# Patient Record
Sex: FEMALE | Race: OTHER | Employment: FULL TIME | URBAN - METROPOLITAN AREA
[De-identification: names, ages, dates, MRNs, and addresses within clinical notes are randomized per-mention and may not be internally consistent; named-entity substitution may affect disease eponyms.]

---

## 2017-07-09 ENCOUNTER — HOSPITAL ENCOUNTER (EMERGENCY)
Age: 37
Discharge: HOME | End: 2017-07-09
Attending: EMERGENCY MEDICINE
Payer: COMMERCIAL

## 2017-07-09 ENCOUNTER — APPOINTMENT (OUTPATIENT)
Dept: GENERAL RADIOLOGY | Age: 37
End: 2017-07-09
Payer: COMMERCIAL

## 2017-07-09 ENCOUNTER — APPOINTMENT (OUTPATIENT)
Dept: CT IMAGING | Age: 37
End: 2017-07-09
Payer: COMMERCIAL

## 2017-07-09 VITALS
RESPIRATION RATE: 16 BRPM | HEART RATE: 83 BPM | DIASTOLIC BLOOD PRESSURE: 92 MMHG | OXYGEN SATURATION: 100 % | SYSTOLIC BLOOD PRESSURE: 135 MMHG | BODY MASS INDEX: 23.3 KG/M2 | TEMPERATURE: 98.2 F | WEIGHT: 140 LBS

## 2017-07-09 DIAGNOSIS — T07.XXXA MULTIPLE ABRASIONS: Primary | ICD-10-CM

## 2017-07-09 PROCEDURE — 70450 CT HEAD/BRAIN W/O DYE: CPT

## 2017-07-09 PROCEDURE — 71010 XR CHEST PORTABLE: CPT

## 2017-07-09 PROCEDURE — 72170 X-RAY EXAM OF PELVIS: CPT

## 2017-07-09 PROCEDURE — 99285 EMERGENCY DEPT VISIT HI MDM: CPT

## 2017-07-09 PROCEDURE — 96376 TX/PRO/DX INJ SAME DRUG ADON: CPT

## 2017-07-09 PROCEDURE — 12001 RPR S/N/AX/GEN/TRNK 2.5CM/<: CPT

## 2017-07-09 PROCEDURE — 73610 X-RAY EXAM OF ANKLE: CPT

## 2017-07-09 PROCEDURE — 6360000002 HC RX W HCPCS: Performed by: EMERGENCY MEDICINE

## 2017-07-09 PROCEDURE — 73562 X-RAY EXAM OF KNEE 3: CPT

## 2017-07-09 PROCEDURE — 96374 THER/PROPH/DIAG INJ IV PUSH: CPT

## 2017-07-09 PROCEDURE — 6360000002 HC RX W HCPCS

## 2017-07-09 PROCEDURE — 73080 X-RAY EXAM OF ELBOW: CPT

## 2017-07-09 RX ORDER — OXYCODONE HYDROCHLORIDE AND ACETAMINOPHEN 5; 325 MG/1; MG/1
1 TABLET ORAL EVERY 8 HOURS PRN
Qty: 10 TABLET | Refills: 0 | Status: SHIPPED | OUTPATIENT
Start: 2017-07-09 | End: 2017-07-12 | Stop reason: SDUPTHER

## 2017-07-09 RX ADMIN — HYDROMORPHONE HYDROCHLORIDE 1 MG: 1 INJECTION, SOLUTION INTRAMUSCULAR; INTRAVENOUS; SUBCUTANEOUS at 12:38

## 2017-07-09 RX ADMIN — HYDROMORPHONE HYDROCHLORIDE 1 MG: 1 INJECTION, SOLUTION INTRAMUSCULAR; INTRAVENOUS; SUBCUTANEOUS at 14:20

## 2017-07-09 RX ADMIN — Medication 1 MG: at 12:38

## 2017-07-09 ASSESSMENT — PAIN DESCRIPTION - LOCATION: LOCATION: GENERALIZED

## 2017-07-09 ASSESSMENT — ENCOUNTER SYMPTOMS
PHOTOPHOBIA: 0
VOMITING: 0
DIARRHEA: 0
CHEST TIGHTNESS: 0
COUGH: 0
ABDOMINAL PAIN: 0
WHEEZING: 0
SHORTNESS OF BREATH: 0
NAUSEA: 0
FACIAL SWELLING: 1

## 2017-07-09 ASSESSMENT — PAIN DESCRIPTION - DESCRIPTORS: DESCRIPTORS: BURNING

## 2017-07-09 ASSESSMENT — PAIN DESCRIPTION - FREQUENCY: FREQUENCY: CONTINUOUS

## 2017-07-09 ASSESSMENT — PAIN DESCRIPTION - PAIN TYPE: TYPE: ACUTE PAIN

## 2017-07-09 ASSESSMENT — PAIN SCALES - GENERAL: PAINLEVEL_OUTOF10: 10
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
## 2017-07-09 NOTE — ED AVS SNAPSHOT
You may receive a survey regarding the care you received during your stay. Your input is valuable to us. We encourage you to complete and return your survey in the envelope provided. We hope you will choose us in the future for your healthcare needs. Patient Information     Patient Name MARLENE Hutchinson 1980      Care Provided at:     Name Address Phone       [facility] [place] [address] [place] [address] [phone]            Your Visit    Here you will find information about your visit, including the reason for your visit. Please take this sheet with you when you visit your doctor or other health care provider in the future. It will help determine the best possible medical care for you at that time. If you have any questions once you leave the hospital, please call the department phone number listed below. Diagnoses this visit     Your diagnosis was MULTIPLE ABRASIONS. Visit Information     Date of Visit Department Dept Phone    2017 [facility] [phone]      You were seen by     You were seen by Angelica Thomas MD, Gloria Benedict MD, and Nicci Espinoza MD.       Follow-up Appointments    Below is a list of your follow-up and future appointments. This may not be a complete list as you may have made appointments directly with providers that we are not aware of or your providers may have made some for you. Please call your providers to confirm appointments. It is important to keep your appointments. Please bring your current insurance card, photo ID, co-pay, and all medication bottles to your appointment. If self-pay, payment is expected at the time of service. Follow-up Information     Follow up with Monica Phan MD In 1 day.     Specialty:  Internal Medicine    Why:  For suture removal, and FMLA paperwork     Contact information:    [facility] [address] [place] [address] [phone] Our records indicate that you have an active Contactualt account. You can view your After Visit Summary by going to [email or web address] and logging in with your Contactualt username and password. If you don't have a [employer] username and password but a parent or guardian has access to your record, the parent or guardian should login with their own Contact[employer]t username and password and access your record to view the After Visit Summary. Additional Information  If you have questions, please contact the physician practice where you receive care. Remember, [employer] is NOT to be used for urgent needs. For medical emergencies, dial 911. For questions regarding your proteonomixhart account call [phone]. If you have a clinical question, please call your doctor's office. View your information online  ? Review your current list of  medications, immunization, and allergies. ? Review your future test results online . ? Review your discharge instructions provided by your caregivers at discharge    Certain functionality such as prescription refills, scheduling appointments or sending messages to your provider are not activated if your provider does not use "[employer] in his/her office    For questions regarding your proteonomixhart account call [phone]. If you have a clinical question, please call your doctor's office. The information on all pages of the After Visit Summary has been reviewed with me, the patient and/or responsible adult, by my health care provider(s). I had the opportunity to ask questions regarding this information. I understand I should dispose of my armband safely at home to protect my health information. A complete copy of the After Visit Summary has been given to me, the patient and/or responsible adult.          Patient Signature/Responsible Adult: ___________________________________    Nurse Signature: ___________________________________ Resident/MLP Signature: ___________________________________  Attending Signature: ___________________________________    Date:____________Time:____________              Discharge Instructions       Thank you for visiting [address] [place] [address] Emergency Department. You need to call in morning to make appointment as directed with appropriate doctor. Should you have any questions regarding your care or further treatment, please call [address] [place] [address] Emergency Department at [phone]. Take any medications as prescribed, if given any, otherwise for pain Use ibuprofen or Tylenol (unless prescribed medications that have Tylenol in it). You can take over the counter Ibuprofen (advil) tablets (4 tablets every 8 hours or 3 tablets every 6 hours or 2 tablets every 4 hours)    Return to ED if symptoms worsen, do not improve, fever > 101.5, excessive nausea or vomiting, and unable to follow up with your physician, or other any other  Care or concern. Scrapes (Abrasions): Care Instructions  Your Care Instructions  Scrapes (abrasions) are wounds where your skin has been rubbed or torn off. Most scrapes do not go deep into the skin, but some may remove several layers of skin. Scrapes usually don't bleed much, but they may ooze pinkish fluid. Scrapes on the head or face may appear worse than they are. They may bleed a lot because of the good blood supply to this area. Most scrapes heal well and may not need a bandage. They usually heal within 3 to 7 days. A large, deep scrape may take 1 to 2 weeks or longer to heal. A scab may form on some scrapes. Follow-up care is a key part of your treatment and safety. Be sure to make and go to all appointments, and call your doctor if you are having problems. It's also a good idea to know your test results and keep a list of the medicines you take. How can you care for yourself at home?   · If your doctor told you how to care for your wound, follow your doctor's instructions. If you did not get instructions, follow this general advice:  ¨ Wash the scrape with clean water 2 times a day. Don't use hydrogen peroxide or alcohol, which can slow healing. ¨ You may cover the scrape with a thin layer of petroleum jelly, such as Vaseline, and a nonstick bandage. ¨ Apply more petroleum jelly and replace the bandage as needed. · Prop up the injured area on a pillow anytime you sit or lie down during the next 3 days. Try to keep it above the level of your heart. This will help reduce swelling. · Be safe with medicines. Take pain medicines exactly as directed. ¨ If the doctor gave you a prescription medicine for pain, take it as prescribed. ¨ If you are not taking a prescription pain medicine, ask your doctor if you can take an over-the-counter medicine. When should you call for help? Call your doctor now or seek immediate medical care if:  · You have signs of infection, such as:  ¨ Increased pain, swelling, warmth, or redness around the scrape. ¨ Red streaks leading from the scrape. ¨ Pus draining from the scrape. ¨ A fever. · The scrape starts to bleed, and blood soaks through the bandage. Oozing small amounts of blood is normal.  Watch closely for changes in your health, and be sure to contact your doctor if the scrape is not getting better each day. Where can you learn more? Go to [email or web address] and sign in to your [employer] account. Enter A374 in the [facility] box to learn more about \"Scrapes (Abrasions): Care Instructions. \"     If you do not have an account, please click on the \"Sign Up Now\" link. Current as of: May 27, 2016  Content Version: 11.2  © 2056-1322 [employer] Care instructions adapted under license by [facility] ([facility]).  If you have questions about a medical condition or this instruction, always ask your healthcare professional. Gerardo Rodriguez
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
## 2017-07-09 NOTE — ED NOTES
Pt resting on cot, nad, rr even and unlabored. Pt updated, no needs.  Suture placement completed by med student     Compa Benedict RN  07/09/17 [id number]
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
## 2017-07-09 NOTE — ED AVS SNAPSHOT
ED Patient Work/School Excuse Letter         [facility] ED  [address] [facility]   Dept: [phone]       July 9, 2017    Patient: Nagi Agee   YOB: 1980   Date of Visit: 7/9/2017       To Whom It May Concern:    Cece Taveras was seen and treated in our Emergency Department on 7/9/2017. She may return to work on 07/12/17     Please use this time to follow up with PCP regarding FMLA paperwork     If you have any questions or concerns, please don't hesitate to call.     Sincerely,    Resident         Signature:__________________________________
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
## 2017-07-12 ENCOUNTER — OFFICE VISIT (OUTPATIENT)
Dept: INTERNAL MEDICINE CLINIC | Age: 37
End: 2017-07-12
Payer: COMMERCIAL

## 2017-07-12 VITALS — HEIGHT: 65 IN | WEIGHT: 137 LBS | BODY MASS INDEX: 22.82 KG/M2 | RESPIRATION RATE: 14 BRPM

## 2017-07-12 DIAGNOSIS — S51.012D: Primary | ICD-10-CM

## 2017-07-12 DIAGNOSIS — T07.XXXA ABRASION, MULTIPLE SITES: ICD-10-CM
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
## 2017-07-12 DIAGNOSIS — M25.471 ANKLE SWELLING, RIGHT: ICD-10-CM

## 2017-07-12 PROCEDURE — 99213 OFFICE O/P EST LOW 20 MIN: CPT | Performed by: NURSE PRACTITIONER

## 2017-07-12 RX ORDER — OXYCODONE HYDROCHLORIDE AND ACETAMINOPHEN 5; 325 MG/1; MG/1
1 TABLET ORAL EVERY 8 HOURS PRN
Qty: 21 TABLET | Refills: 0 | Status: SHIPPED | OUTPATIENT
Start: 2017-07-12 | End: 2017-07-19

## 2017-07-12 RX ORDER — IBUPROFEN 800 MG/1
800 TABLET ORAL EVERY 8 HOURS PRN
Qty: 90 TABLET | Refills: 0 | Status: SHIPPED | OUTPATIENT
Start: 2017-07-12

## 2017-07-12 ASSESSMENT — ENCOUNTER SYMPTOMS
SINUS PRESSURE: 0
BLOOD IN STOOL: 0
COUGH: 0
WHEEZING: 0
SHORTNESS OF BREATH: 0
ABDOMINAL PAIN: 0
RHINORRHEA: 0
EYE DISCHARGE: 0

## 2017-07-12 ASSESSMENT — PATIENT HEALTH QUESTIONNAIRE - PHQ9
2. FEELING DOWN, DEPRESSED OR HOPELESS: 0
SUM OF ALL RESPONSES TO PHQ QUESTIONS 1-9: 0
1. LITTLE INTEREST OR PLEASURE IN DOING THINGS: 0
SUM OF ALL RESPONSES TO PHQ9 QUESTIONS 1 & 2: 0

## 2017-07-17 ENCOUNTER — OFFICE VISIT (OUTPATIENT)
Dept: INTERNAL MEDICINE CLINIC | Age: 37
End: 2017-07-17
Payer: COMMERCIAL

## 2017-07-17 VITALS — BODY MASS INDEX: 23.16 KG/M2 | HEIGHT: 65 IN | HEART RATE: 80 BPM | WEIGHT: 139 LBS

## 2017-07-17 DIAGNOSIS — L24.A9 WOUND DRAINAGE: Primary | ICD-10-CM

## 2017-07-17 PROCEDURE — 99213 OFFICE O/P EST LOW 20 MIN: CPT | Performed by: NURSE PRACTITIONER

## 2017-07-17 RX ORDER — AMOXICILLIN AND CLAVULANATE POTASSIUM 875; 125 MG/1; MG/1
1 TABLET, FILM COATED ORAL 2 TIMES DAILY
Qty: 20 TABLET | Refills: 0 | Status: SHIPPED | OUTPATIENT
Start: 2017-07-17 | End: 2017-07-27

## 2017-07-27 ENCOUNTER — OFFICE VISIT (OUTPATIENT)
Dept: INTERNAL MEDICINE CLINIC | Age: 37
End: 2017-07-27
Payer: COMMERCIAL

## 2017-07-27 VITALS
HEIGHT: 65 IN | SYSTOLIC BLOOD PRESSURE: 122 MMHG | BODY MASS INDEX: 23.18 KG/M2 | WEIGHT: 139.11 LBS | DIASTOLIC BLOOD PRESSURE: 78 MMHG

## 2017-07-27 DIAGNOSIS — T14.8XXA MULTIPLE WOUNDS OF SKIN: ICD-10-CM
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
## 2017-07-27 DIAGNOSIS — S99.911D ANKLE INJURY, RIGHT, SUBSEQUENT ENCOUNTER: ICD-10-CM
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
## 2017-07-27 PROCEDURE — 99213 OFFICE O/P EST LOW 20 MIN: CPT | Performed by: NURSE PRACTITIONER

## 2017-07-27 ASSESSMENT — ENCOUNTER SYMPTOMS
SHORTNESS OF BREATH: 0
RHINORRHEA: 0
COUGH: 0
ABDOMINAL PAIN: 0
BLOOD IN STOOL: 0
EYE DISCHARGE: 0
WHEEZING: 0
SINUS PRESSURE: 0

## 2017-09-07 ENCOUNTER — OFFICE VISIT (OUTPATIENT)
Dept: INTERNAL MEDICINE CLINIC | Age: 37
End: 2017-09-07
Payer: COMMERCIAL

## 2017-09-07 VITALS
HEIGHT: 65 IN | WEIGHT: 138 LBS | SYSTOLIC BLOOD PRESSURE: 118 MMHG | DIASTOLIC BLOOD PRESSURE: 78 MMHG | BODY MASS INDEX: 22.99 KG/M2

## 2017-09-07 DIAGNOSIS — L68.0 HIRSUTISM: ICD-10-CM

## 2017-09-07 DIAGNOSIS — F41.9 ANXIETY: Primary | ICD-10-CM

## 2017-09-07 DIAGNOSIS — E28.2 PCOS (POLYCYSTIC OVARIAN SYNDROME): ICD-10-CM
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
## 2017-09-07 PROCEDURE — 99214 OFFICE O/P EST MOD 30 MIN: CPT | Performed by: INTERNAL MEDICINE

## 2017-09-07 RX ORDER — CITALOPRAM 10 MG/1
10 TABLET ORAL DAILY
Qty: 30 TABLET | Refills: 3 | Status: SHIPPED | OUTPATIENT
Start: 2017-09-07 | End: 2017-10-09 | Stop reason: DRUGHIGH

## 2017-09-07 ASSESSMENT — ENCOUNTER SYMPTOMS
COUGH: 0
TROUBLE SWALLOWING: 0
STRIDOR: 0
ABDOMINAL DISTENTION: 0
BLOOD IN STOOL: 0
SORE THROAT: 0
CHOKING: 0
EYE PAIN: 0
APNEA: 0
EYE REDNESS: 0
NAUSEA: 0
EYE ITCHING: 0
PHOTOPHOBIA: 0
DIARRHEA: 0
EYE DISCHARGE: 0
VOICE CHANGE: 0
RECTAL PAIN: 0
SINUS PRESSURE: 0
CHEST TIGHTNESS: 0
ANAL BLEEDING: 0
SHORTNESS OF BREATH: 0
RHINORRHEA: 0
ABDOMINAL PAIN: 0
COLOR CHANGE: 0
FACIAL SWELLING: 0
BACK PAIN: 0
WHEEZING: 0
CONSTIPATION: 1
VOMITING: 0

## 2017-09-18 ENCOUNTER — HOSPITAL ENCOUNTER (OUTPATIENT)
Age: 37
Discharge: HOME | End: 2017-09-18
Payer: COMMERCIAL

## 2017-09-18 DIAGNOSIS — L68.0 HIRSUTISM: ICD-10-CM

## 2017-09-18 DIAGNOSIS — F41.9 ANXIETY: ICD-10-CM

## 2017-09-18 DIAGNOSIS — E28.2 PCOS (POLYCYSTIC OVARIAN SYNDROME): ICD-10-CM

## 2017-09-18 LAB
-: ABNORMAL
ABSOLUTE EOS #: 0.1 K/UL (ref 0–0.4)
ABSOLUTE LYMPH #: 1.9 K/UL (ref 1–4.8)
ABSOLUTE MONO #: 0.5 K/UL (ref 0.2–0.8)
ALBUMIN SERPL-MCNC: 4.1 G/DL (ref 3.5–5.2)
ALBUMIN/GLOBULIN RATIO: ABNORMAL (ref 1–2.5)
ALP BLD-CCNC: 49 U/L (ref 35–104)
ALT SERPL-CCNC: 9 U/L (ref 5–33)
AMORPHOUS: ABNORMAL
ANION GAP SERPL CALCULATED.3IONS-SCNC: 10 MMOL/L (ref 9–17)
AST SERPL-CCNC: 14 U/L
BACTERIA: ABNORMAL
BASOPHILS # BLD: 1 %
BASOPHILS ABSOLUTE: 0 K/UL (ref 0–0.2)
BILIRUB SERPL-MCNC: 1.19 MG/DL (ref 0.3–1.2)
BILIRUBIN URINE: NEGATIVE
BUN BLDV-MCNC: 14 MG/DL (ref 6–20)
BUN/CREAT BLD: 18 (ref 9–20)
CALCIUM SERPL-MCNC: 8.4 MG/DL (ref 8.6–10.4)
CASTS UA: ABNORMAL /LPF
CHLORIDE BLD-SCNC: 104 MMOL/L (ref 98–107)
CHOLESTEROL/HDL RATIO: 2.6
CHOLESTEROL: 143 MG/DL
CO2: 26 MMOL/L (ref 20–31)
COLOR: ABNORMAL
COMMENT UA: ABNORMAL
CREAT SERPL-MCNC: 0.79 MG/DL (ref 0.5–0.9)
CRYSTALS, UA: ABNORMAL /HPF
DIFFERENTIAL TYPE: NORMAL
EOSINOPHILS RELATIVE PERCENT: 2 %
EPITHELIAL CELLS UA: ABNORMAL /HPF
GFR AFRICAN AMERICAN: >60 ML/MIN
GFR NON-AFRICAN AMERICAN: >60 ML/MIN
GFR SERPL CREATININE-BSD FRML MDRD: ABNORMAL ML/MIN/{1.73_M2}
GFR SERPL CREATININE-BSD FRML MDRD: ABNORMAL ML/MIN/{1.73_M2}
GLUCOSE BLD-MCNC: 91 MG/DL (ref 70–99)
GLUCOSE URINE: NEGATIVE
HCT VFR BLD CALC: 41 % (ref 36–46)
HDLC SERPL-MCNC: 54 MG/DL
HEMOGLOBIN: 14.2 G/DL (ref 12–16)
KETONES, URINE: NEGATIVE
LDL CHOLESTEROL: 79 MG/DL (ref 0–130)
LEUKOCYTE ESTERASE, URINE: ABNORMAL
LYMPHOCYTES # BLD: 31 %
MCH RBC QN AUTO: 32.4 PG (ref 26–34)
MCHC RBC AUTO-ENTMCNC: 34.7 G/DL (ref 31–37)
MCV RBC AUTO: 93.2 FL (ref 80–100)
MONOCYTES # BLD: 8 %
MUCUS: ABNORMAL
NITRITE, URINE: NEGATIVE
OTHER OBSERVATIONS UA: ABNORMAL
PDW BLD-RTO: 11.9 % (ref 11.5–14.5)
PH UA: 6.5 (ref 5–8)
PLATELET # BLD: 249 K/UL (ref 130–400)
PLATELET ESTIMATE: NORMAL
PMV BLD AUTO: NORMAL FL (ref 6–12)
POTASSIUM SERPL-SCNC: 4.1 MMOL/L (ref 3.7–5.3)
PROTEIN UA: ABNORMAL
RBC # BLD: 4.4 M/UL (ref 4–5.2)
RBC # BLD: NORMAL 10*6/UL
RBC UA: ABNORMAL /HPF (ref 0–2)
RENAL EPITHELIAL, UA: ABNORMAL /HPF
SEG NEUTROPHILS: 58 %
SEGMENTED NEUTROPHILS ABSOLUTE COUNT: 3.4 K/UL (ref 1.8–7.7)
SODIUM BLD-SCNC: 140 MMOL/L (ref 135–144)
SPECIFIC GRAVITY UA: 1.02 (ref 1–1.03)
TOTAL PROTEIN: 6.1 G/DL (ref 6.4–8.3)
TRICHOMONAS: ABNORMAL
TRIGL SERPL-MCNC: 52 MG/DL
TSH SERPL DL<=0.05 MIU/L-ACNC: 1.96 MIU/L (ref 0.3–5)
TURBIDITY: ABNORMAL
URINE HGB: ABNORMAL
UROBILINOGEN, URINE: NORMAL
VITAMIN D 25-HYDROXY: 34.7 NG/ML (ref 30–100)
VLDLC SERPL CALC-MCNC: NORMAL MG/DL (ref 1–30)
WBC # BLD: 5.9 K/UL (ref 3.5–11)
WBC # BLD: NORMAL 10*3/UL
WBC UA: ABNORMAL /HPF (ref 0–5)
YEAST: ABNORMAL

## 2017-09-18 PROCEDURE — 80053 COMPREHEN METABOLIC PANEL: CPT

## 2017-09-18 PROCEDURE — 80061 LIPID PANEL: CPT

## 2017-09-18 PROCEDURE — 36415 COLL VENOUS BLD VENIPUNCTURE: CPT

## 2017-09-18 PROCEDURE — 82306 VITAMIN D 25 HYDROXY: CPT

## 2017-09-18 PROCEDURE — 84443 ASSAY THYROID STIM HORMONE: CPT

## 2017-09-18 PROCEDURE — 81001 URINALYSIS AUTO W/SCOPE: CPT

## 2017-09-18 PROCEDURE — 85025 COMPLETE CBC W/AUTO DIFF WBC: CPT

## 2017-09-19 RX ORDER — CIPROFLOXACIN 250 MG/1
250 TABLET, FILM COATED ORAL 2 TIMES DAILY
Qty: 14 TABLET | Refills: 0 | Status: SHIPPED | OUTPATIENT
Start: 2017-09-19 | End: 2017-09-26

## 2017-10-09 ENCOUNTER — OFFICE VISIT (OUTPATIENT)
Dept: INTERNAL MEDICINE CLINIC | Age: 37
End: 2017-10-09
Payer: COMMERCIAL

## 2017-10-09 VITALS
HEIGHT: 65 IN | BODY MASS INDEX: 23.32 KG/M2 | DIASTOLIC BLOOD PRESSURE: 78 MMHG | WEIGHT: 140 LBS | SYSTOLIC BLOOD PRESSURE: 120 MMHG

## 2017-10-09 DIAGNOSIS — E28.2 PCOS (POLYCYSTIC OVARIAN SYNDROME): ICD-10-CM

## 2017-10-09 DIAGNOSIS — F41.9 ANXIETY: Primary | ICD-10-CM

## 2017-10-09 DIAGNOSIS — F33.1 MODERATE EPISODE OF RECURRENT MAJOR DEPRESSIVE DISORDER (HCC): ICD-10-CM

## 2017-10-09 DIAGNOSIS — J30.1 SEASONAL ALLERGIC RHINITIS DUE TO POLLEN: ICD-10-CM

## 2017-10-09 PROCEDURE — 99214 OFFICE O/P EST MOD 30 MIN: CPT | Performed by: INTERNAL MEDICINE

## 2017-10-09 RX ORDER — AZELASTINE 1 MG/ML
2 SPRAY, METERED NASAL 2 TIMES DAILY
Qty: 1 BOTTLE | Refills: 3 | Status: SHIPPED | OUTPATIENT
Start: 2017-10-09

## 2017-10-09 RX ORDER — CITALOPRAM 10 MG/1
20 TABLET ORAL DAILY
Qty: 30 TABLET | Refills: 3 | Status: SHIPPED | OUTPATIENT
Start: 2017-10-09 | End: 2017-10-24 | Stop reason: SDUPTHER

## 2017-10-09 ASSESSMENT — ENCOUNTER SYMPTOMS
RECTAL PAIN: 0
PHOTOPHOBIA: 0
CONSTIPATION: 0
SINUS PRESSURE: 1
RHINORRHEA: 1
EYE DISCHARGE: 0
EYE PAIN: 0
VOICE CHANGE: 0
CHOKING: 0
EYE REDNESS: 0
BLOOD IN STOOL: 0
SORE THROAT: 0
ANAL BLEEDING: 0
EYE ITCHING: 0
CHEST TIGHTNESS: 0
BACK PAIN: 0
ABDOMINAL PAIN: 0
WHEEZING: 0
SHORTNESS OF BREATH: 1
STRIDOR: 0
ABDOMINAL DISTENTION: 0
NAUSEA: 0
COUGH: 0
FACIAL SWELLING: 0
APNEA: 0
VOMITING: 0
TROUBLE SWALLOWING: 0
DIARRHEA: 0
COLOR CHANGE: 0

## 2017-10-09 NOTE — PROGRESS NOTES
Subjective:      Vidal Callaway is a 30 y.o. female who presents today for follow up and management of her chronic medical conditions as noted below. HPI:    Vidal Callaway is c/o of   Chief Complaint   Patient presents with    Anxiety     follow up after medication change    . Allergies   congestion    depressoin   poor sleep    pcos no chasnge   Past Medical History:   Diagnosis Date    Allergic rhinitis     Anxiety     Blood transfusion     Hx postparum hemmorrhage    Depression     Substance abuse     smoker       Past Surgical History:   Procedure Laterality Date    CYST REMOVAL      back of knee    DILATION AND CURETTAGE      INTRAUTERINE DEVICE INSERTION  09/23/10    lot [id number]    WISDOM TOOTH EXTRACTION  2002       Family History   Problem Relation Age of Onset    Cancer Maternal Aunt      esophagus    Cancer Paternal Grandfather      colon    Cancer Maternal Aunt      lung    Heart Disease Father     Heart Surgery Father     Thyroid Disease Mother      Hypothyroid    Schizophrenia Mother     Cancer Maternal Aunt      breast       Social History     Social History    Marital status: Single     Spouse name: N/A    Number of children: N/A    Years of education: N/A     Social History Main Topics    Smoking status: Former Smoker    Smokeless tobacco: Never Used    Alcohol use Yes      Comment: social    Drug use: No    Sexual activity: Yes     Partners: Male     Other Topics Concern    None     Social History Narrative    None       Current Outpatient Prescriptions   Medication Sig Dispense Refill    citalopram (CELEXA) 10 MG tablet Take 2 tablets by mouth daily 30 tablet 3    azelastine (ASTELIN) 0.1 % nasal spray 2 sprays by Nasal route 2 times daily Use in each nostril as directed 1 Bottle 3    ibuprofen (ADVIL;MOTRIN) 800 MG tablet Take 1 tablet by mouth every 8 hours as needed for Pain 90 tablet 0     No current facility-administered medications for this visit. Review of Systems:    Review of Systems   Constitutional: Negative for activity change, appetite change, chills, diaphoresis, fatigue and fever. HENT: Positive for congestion, rhinorrhea and sinus pressure. Negative for dental problem, drooling, ear discharge, ear pain, facial swelling, hearing loss, mouth sores, nosebleeds, postnasal drip, sneezing, sore throat, tinnitus, trouble swallowing and voice change. Eyes: Negative for photophobia, pain, discharge, redness, itching and visual disturbance. Respiratory: Positive for shortness of breath. Negative for apnea, cough, choking, chest tightness, wheezing and stridor. Cardiovascular: Negative for chest pain, palpitations and leg swelling. Gastrointestinal: Negative for abdominal distention, abdominal pain, anal bleeding, blood in stool, constipation, diarrhea, nausea, rectal pain and vomiting. Endocrine: Negative for cold intolerance, heat intolerance, polydipsia, polyphagia and polyuria. Genitourinary: Negative for decreased urine volume, difficulty urinating, dyspareunia, dysuria, enuresis, flank pain, frequency, genital sores, hematuria, menstrual problem, pelvic pain, urgency, vaginal bleeding and vaginal discharge. Musculoskeletal: Negative for arthralgias, back pain, gait problem, joint swelling, myalgias, neck pain and neck stiffness. Skin: Negative for color change, pallor, rash and wound. Neurological: Negative for dizziness, tremors, seizures, syncope, facial asymmetry, speech difficulty, weakness, light-headedness, numbness and headaches. Hematological: Negative for adenopathy. Does not bruise/bleed easily. Psychiatric/Behavioral: Positive for dysphoric mood and sleep disturbance. Negative for agitation, behavioral problems, confusion and decreased concentration. The patient is nervous/anxious. Objective:     Physical Exam:      Physical Exam   Constitutional: She is oriented to person, place, and time.  She appears well-developed and well-nourished. No distress. HENT:   Head: Normocephalic and atraumatic. Right Ear: External ear normal.   Left Ear: External ear normal.   Nose: Mucosal edema present. Right sinus exhibits maxillary sinus tenderness. Left sinus exhibits maxillary sinus tenderness. Mouth/Throat: Oropharynx is clear and moist. No oropharyngeal exudate. Eyes: Conjunctivae and EOM are normal. Pupils are equal, round, and reactive to light. Right eye exhibits no discharge. Left eye exhibits no discharge. No scleral icterus. Neck: Normal range of motion. Neck supple. No JVD present. No tracheal deviation present. No thyromegaly present. Cardiovascular: Normal rate, regular rhythm, normal heart sounds and intact distal pulses. Exam reveals no gallop and no friction rub. No murmur heard. Pulmonary/Chest: Effort normal and breath sounds normal. No respiratory distress. She has no wheezes. She has no rales. She exhibits no tenderness. Abdominal: Soft. Bowel sounds are normal. She exhibits no distension and no mass. There is no tenderness. There is no rebound and no guarding. Genitourinary: Rectal exam shows guaiac negative stool. No vaginal discharge found. Musculoskeletal: She exhibits tenderness. She exhibits no edema. Lymphadenopathy:     She has no cervical adenopathy. Neurological: She is alert and oriented to person, place, and time. She has normal reflexes. No cranial nerve deficit. She exhibits normal muscle tone. Coordination normal.   Skin: Skin is warm and dry. No rash noted. She is not diaphoretic. No erythema. No pallor. Psychiatric: Her behavior is normal. Judgment and thought content normal. Her mood appears anxious. She exhibits a depressed mood.          Results for orders placed or performed during the hospital encounter of 09/18/17   Lipid Panel   Result Value Ref Range    Cholesterol 143 <200 mg/dL    HDL 54 >40 mg/dL    LDL Cholesterol 79 0 - 130 mg/dL    Chol/HDL instructions  2. Discussed use, benefit, and side effects of prescribed medications. Barriers to medication compliance addressed. All patient questions answered. Pt voiced understanding. 3.  Reviewed prior labs and health maintenance  4. Continue current medications, diet and exercise.   5.  6 weeks

## 2017-10-24 RX ORDER — CITALOPRAM 20 MG/1
20 TABLET ORAL DAILY
Qty: 30 TABLET | Refills: 3 | Status: SHIPPED | OUTPATIENT
Start: 2017-10-24

## 2017-10-24 NOTE — TELEPHONE ENCOUNTER
Medication: citalopram  Last visit: 10/09/17  Next visit: 11/20/2017  Last refill: 10/09/17 but wrong dose  Pharmacy: [facility]

## 2017-11-20 ENCOUNTER — HOSPITAL ENCOUNTER (OUTPATIENT)
Age: 37
Discharge: HOME | End: 2017-11-20
Payer: COMMERCIAL

## 2017-11-20 LAB — IGE: 4 IU/ML

## 2017-11-20 PROCEDURE — 36415 COLL VENOUS BLD VENIPUNCTURE: CPT

## 2017-11-20 PROCEDURE — 82785 ASSAY OF IGE: CPT

## 2017-11-20 PROCEDURE — 86003 ALLG SPEC IGE CRUDE XTRC EA: CPT

## 2017-11-21 LAB
ALLERGEN BANANA: <0.34 KU/L (ref 0–0.34)
ALLERGEN BEEF: <0.34 KU/L (ref 0–0.34)
ALLERGEN CASEIN: <0.34 KU/L (ref 0–0.34)
ALLERGEN CHICKEN IGE: <0.34 KU/L (ref 0–0.34)
ALLERGEN CHOCOLATE IGE: <0.34 KU/L (ref 0–0.34)
ALLERGEN CINNAMON IGE: <0.34 KU/L (ref 0–0.34)
ALLERGEN CODFISH IGE: <0.34 KU/L (ref 0–0.34)
ALLERGEN CORN IGE: <0.34 KU/L (ref 0–0.34)
ALLERGEN COW MILK IGE: <0.34 KU/L (ref 0–0.34)
ALLERGEN EGG WHITE IGE: <0.34 KU/L (ref 0–0.34)
ALLERGEN EGG, WHOLE IGE: <0.34 KU/L (ref 0–0.34)
ALLERGEN GARLIC IGE: <0.34 KU/L (ref 0–0.34)
ALLERGEN GREEN PEA: <0.34 KU/L (ref 0–0.34)
ALLERGEN MUSTARD IGE: <0.34 KU/L (ref 0–0.34)
ALLERGEN ORANGE IGE: <0.34 KU/L (ref 0–0.34)
ALLERGEN PEANUT (F13) IGE: <0.34 KU/L (ref 0–0.34)
ALLERGEN PORK: <0.34 KU/L (ref 0–0.34)
ALLERGEN RICE IGE: <0.34 KU/L (ref 0–0.34)
ALLERGEN SCALLOP IGE: <0.34 KU/L (ref 0–0.34)
ALLERGEN SOYBEAN IGE: <0.34 KU/L (ref 0–0.34)
ALLERGEN STRAWBERRY IGE: <0.34 KU/L (ref 0–0.34)
ALLERGEN TOMATO IGE: <0.34 KU/L (ref 0–0.34)
ALLERGEN TUNA IGE: <0.34 KU/L (ref 0–0.34)
ALLERGEN WALNUT IGE: <0.34 KU/L (ref 0–0.34)
ALLERGEN WHEAT IGE: <0.34 KU/L (ref 0–0.34)
BAKERS YEAST, IGE: <0.34 KU/L (ref 0–0.34)
IGE: 4 IU/ML
POTATO, IGE: <0.34 KU/L (ref 0–0.34)
SHRIMP: <0.34 KU/L (ref 0–0.34)

## 2018-09-29 ENCOUNTER — APPOINTMENT (OUTPATIENT)
Dept: GENERAL RADIOLOGY | Age: 38
End: 2018-09-29
Payer: COMMERCIAL

## 2018-09-29 ENCOUNTER — HOSPITAL ENCOUNTER (EMERGENCY)
Age: 38
Discharge: HOME | End: 2018-09-29
Attending: EMERGENCY MEDICINE
Payer: COMMERCIAL

## 2018-09-29 VITALS
TEMPERATURE: 98.6 F | WEIGHT: 145 LBS | HEIGHT: 65 IN | SYSTOLIC BLOOD PRESSURE: 125 MMHG | RESPIRATION RATE: 16 BRPM | DIASTOLIC BLOOD PRESSURE: 70 MMHG | OXYGEN SATURATION: 98 % | HEART RATE: 85 BPM | BODY MASS INDEX: 24.16 KG/M2

## 2018-09-29 DIAGNOSIS — S61.216A LACERATION OF RIGHT LITTLE FINGER WITHOUT FOREIGN BODY WITHOUT DAMAGE TO NAIL, INITIAL ENCOUNTER: Primary | ICD-10-CM

## 2018-09-29 PROCEDURE — 73130 X-RAY EXAM OF HAND: CPT

## 2018-09-29 PROCEDURE — 6370000000 HC RX 637 (ALT 250 FOR IP): Performed by: PHYSICIAN ASSISTANT

## 2018-09-29 PROCEDURE — 12001 RPR S/N/AX/GEN/TRNK 2.5CM/<: CPT

## 2018-09-29 PROCEDURE — 99283 EMERGENCY DEPT VISIT LOW MDM: CPT

## 2018-09-29 RX ORDER — IBUPROFEN 800 MG/1
800 TABLET ORAL ONCE
Status: COMPLETED | OUTPATIENT
Start: 2018-09-29 | End: 2018-09-29

## 2018-09-29 RX ORDER — CEPHALEXIN 500 MG/1
500 CAPSULE ORAL 4 TIMES DAILY
Qty: 28 CAPSULE | Refills: 0 | Status: SHIPPED | OUTPATIENT
Start: 2018-09-29 | End: 2018-10-06

## 2018-09-29 RX ADMIN — IBUPROFEN 800 MG: 800 TABLET ORAL at 18:21

## 2018-09-29 ASSESSMENT — ENCOUNTER SYMPTOMS
ABDOMINAL PAIN: 0
BACK PAIN: 0
SORE THROAT: 0
NAUSEA: 0
SHORTNESS OF BREATH: 0
COUGH: 0
DIARRHEA: 0
VOMITING: 0
COLOR CHANGE: 0

## 2018-09-29 ASSESSMENT — PAIN DESCRIPTION - FREQUENCY: FREQUENCY: CONTINUOUS

## 2018-09-29 ASSESSMENT — PAIN DESCRIPTION - LOCATION: LOCATION: FINGER (COMMENT WHICH ONE)

## 2018-09-29 ASSESSMENT — PAIN DESCRIPTION - DESCRIPTORS: DESCRIPTORS: BURNING

## 2018-09-29 ASSESSMENT — PAIN DESCRIPTION - PAIN TYPE: TYPE: ACUTE PAIN

## 2018-09-29 ASSESSMENT — PAIN SCALES - GENERAL
PAINLEVEL_OUTOF10: 6
PAINLEVEL_OUTOF10: 6

## 2018-09-29 ASSESSMENT — PAIN DESCRIPTION - ORIENTATION: ORIENTATION: RIGHT

## 2018-09-29 NOTE — ED PROVIDER NOTES
Procedure Laterality Date    CYST REMOVAL      back of knee    DILATION AND CURETTAGE      INTRAUTERINE DEVICE INSERTION  09/23/10    lot [id number]    WISDOM TOOTH EXTRACTION         CURRENT MEDICATIONS       Discharge Medication List as of 2018  7:32 PM      CONTINUE these medications which have NOT CHANGED    Details   citalopram (CELEXA) 20 MG tablet Take 1 tablet by mouth daily, Disp-30 tablet, R-3Normal      azelastine (ASTELIN) 0.1 % nasal spray 2 sprays by Nasal route 2 times daily Use in each nostril as directed, Disp-1 Bottle, R-3Normal      ibuprofen (ADVIL;MOTRIN) 800 MG tablet Take 1 tablet by mouth every 8 hours as needed for Pain, Disp-90 tablet, R-0Normal             ALLERGIES     Patient has no known allergies. Reviewed  FAMILY HISTORY       Family History   Problem Relation Age of Onset    Cancer Maternal Aunt         esophagus    Cancer Paternal Grandfather         colon    Cancer Maternal Aunt         lung    Heart Disease Father     Heart Surgery Father     Thyroid Disease Mother         Hypothyroid    Schizophrenia Mother     Cancer Maternal Aunt         breast     Family Status   Relation Status    MAunt     PGF    Mary Ellen Royal     Father     Mother Alive   Mary Ellen Morales (Not Specified)        SOCIAL HISTORY      reports that she has quit smoking. She has never used smokeless tobacco. She reports that she drinks alcohol. She reports that she does not use drugs. PHYSICAL EXAM    (up to 7 for level 4, 8 or more for level 5)     Vitals:    18 1816   BP: 125/70   Pulse: 85   Resp: 16   Temp: 98.6 °F (37 °C)   TempSrc: Oral   SpO2: 98%   Weight: 145 lb (65.8 kg)   Height: 5' 5\" (1.651 m)       Physical Exam   Constitutional: She is oriented to person, place, and time. She appears well-developed and well-nourished. No distress. HENT:   Head: Normocephalic and atraumatic. Eyes: Pupils are equal, round, and reactive to light.  Conjunctivae and Oral   SpO2: 98%   Weight: 145 lb (65.8 kg)   Height: 5' 5\" (1.651 m)       This is a 29year old female presenting to the emergency department with a laceration noted to her ulnar surface of her right hand. We will clean the laceration extensively and we did check tendon function and it was intact and normal. We will obtain an x-ray of the right hand to rule out foreign body at this time. We will repair the laceration. She was given all the signs symptoms of infection and told to return if they present. She is told to keep the wound clean and dry and change dressing twice daily with bacitracin application. She was told to follow-up with hand surgery in 3 days for a recheck and her primary care physician in 3 days for wound check and follow-up in 10 days for suture removal. If she cannot get into her primary care physician revealed like her to return here in 2 days for a wound check. X-rays are unremarkable and there are no foreign body noted. Patient understood and will comply. Patient is satisfied. All questions answered. Attending physician, myself, and patient agreed no further workup is necessary at this time. Patient was given very strict return protocols and is completely agreeable with this plan. We will also give her Keflex to take. She should take this antibiotic all the way through to completion and again follow-up in 2 days. We did splinter as well using a volar metal splint for comfort. Patient is neurovascularly intact after splint placement. Work note given. Same procedure note for both minor lacerations. The larger laceration noted to the actual finger itself there were 4 sutures placed and the minor 1 there were 3 sutures placed to make 7 total.    This patient was seen by the attending physician and they agreed with the assessment and plan.     CONSULTS:  None    PROCEDURES:  Lac Repair  Date/Time: 9/29/2018 7:40 PM  Performed by: Tahmina Giordano  Authorized by: Azam Aleman     Consent:

## 2018-10-04 NOTE — ED PROVIDER NOTES
[address] [facility]     Emergency Department     Faculty Attestation    I performed a history and physical examination of the patient and discussed management with the resident. I reviewed the residents note and agree with the documented findings and plan of care. Any areas of disagreement are noted on the chart. I was personally present for the key portions of any procedures. I have documented in the chart those procedures where I was not present during the key portions. I have reviewed the emergency nurses triage note. I agree with the chief complaint, past medical history, past surgical history, allergies, medications, social and family history as documented unless otherwise noted below. Documentation of the HPI, Physical Exam and Medical Decision Making performed by medical students or scribes is based on my personal performance of the HPI, PE and MDM. For Midlevel providers: I have personally seen and evaluated the patient. I find the patient's history and physical exam are consistent with the NP/PA documentation.   I agree with the care provided, treatment rendered, disposition and follow-up plan      [facility]      Critical Care          MD Marshall Gaming MD  10/03/18 [id number]

## 2018-10-08 ENCOUNTER — OFFICE VISIT (OUTPATIENT)
Dept: INTERNAL MEDICINE CLINIC | Age: 38
End: 2018-10-08
Payer: COMMERCIAL

## 2018-10-08 VITALS
WEIGHT: 143 LBS | HEIGHT: 65 IN | BODY MASS INDEX: 23.82 KG/M2 | SYSTOLIC BLOOD PRESSURE: 120 MMHG | DIASTOLIC BLOOD PRESSURE: 70 MMHG

## 2018-10-08 DIAGNOSIS — F41.9 ANXIETY: ICD-10-CM

## 2018-10-08 DIAGNOSIS — F32.1 CURRENT MODERATE EPISODE OF MAJOR DEPRESSIVE DISORDER WITHOUT PRIOR EPISODE (HCC): ICD-10-CM

## 2018-10-08 DIAGNOSIS — T14.8XXA INFECTED LACERATION OF SKIN: Primary | ICD-10-CM

## 2018-10-08 DIAGNOSIS — L08.9 INFECTED LACERATION OF SKIN: Primary | ICD-10-CM

## 2018-10-08 PROCEDURE — 99213 OFFICE O/P EST LOW 20 MIN: CPT | Performed by: INTERNAL MEDICINE

## 2018-10-08 ASSESSMENT — ENCOUNTER SYMPTOMS
EYE DISCHARGE: 0
ABDOMINAL DISTENTION: 0
COLOR CHANGE: 0
WHEEZING: 0
TROUBLE SWALLOWING: 0
SHORTNESS OF BREATH: 0
BLOOD IN STOOL: 0
EYE PAIN: 0
DIARRHEA: 0
COUGH: 0

## 2018-10-08 NOTE — PROGRESS NOTES
Subjective:      Visit Information    Have you changed or started any medications since your last visit including any over-the-counter medicines, vitamins, or herbal medicines? no   Have you stopped taking any of your medications? Is so, why? -  no  Are you having any side effects from any of your medications? - no    Have you seen any other physician or provider since your last visit?  no   Have you had any other diagnostic tests since your last visit?  no   Have you been seen in the emergency room and/or had an admission in a hospital since we last saw you?  yes - finger laceration   Have you had your routine dental cleaning in the past 6 months?  no     Do you have an active MyChart account? If no, what is the barrier? Yes    Patient Care Team:  Cleve Stevens MD as PCP - General (Internal Medicine)  Cleve Stevens MD as PCP - S Attributed Provider    Medical History Review  Past Medical, Family, and Social History reviewed and does not contribute to the patient presenting condition    Health Maintenance   Topic Date Due    DTaP/Tdap/Td vaccine (1 - Tdap) 06/28/2001    Cervical cancer screen  07/29/2018    Flu vaccine (1) 09/01/2018    HIV screen  Completed             Patient ID: Wendi Martin is a 41 y.o. female. lacraiton in [facility] rt hand  Got sutres done at [facility]        Review of Systems   Constitutional: Negative for appetite change, diaphoresis and fatigue. HENT: Negative for ear discharge and trouble swallowing. Eyes: Negative for pain and discharge. Respiratory: Negative for cough, shortness of breath and wheezing. Cardiovascular: Negative for chest pain and palpitations. Gastrointestinal: Negative for abdominal distention, blood in stool and diarrhea. Endocrine: Negative for polydipsia and polyphagia. Genitourinary: Negative for difficulty urinating and frequency. Musculoskeletal: Negative for gait problem, myalgias and neck pain.         Lacraiton in [facility] inflamtion   On abx  Cont   Anxiety and dpression  seeing psychiatrist  Asking for Shannen Negro MD